# Patient Record
Sex: FEMALE | Race: BLACK OR AFRICAN AMERICAN | NOT HISPANIC OR LATINO
[De-identification: names, ages, dates, MRNs, and addresses within clinical notes are randomized per-mention and may not be internally consistent; named-entity substitution may affect disease eponyms.]

---

## 2020-02-26 ENCOUNTER — APPOINTMENT (OUTPATIENT)
Dept: HUMAN REPRODUCTION | Facility: CLINIC | Age: 35
End: 2020-02-26
Payer: COMMERCIAL

## 2020-02-26 PROBLEM — Z00.00 ENCOUNTER FOR PREVENTIVE HEALTH EXAMINATION: Status: ACTIVE | Noted: 2020-02-26

## 2020-02-26 PROCEDURE — 76817 TRANSVAGINAL US OBSTETRIC: CPT

## 2020-02-26 PROCEDURE — 99205 OFFICE O/P NEW HI 60 MIN: CPT | Mod: 25

## 2020-03-17 ENCOUNTER — TRANSCRIPTION ENCOUNTER (OUTPATIENT)
Age: 35
End: 2020-03-17

## 2020-03-25 ENCOUNTER — APPOINTMENT (OUTPATIENT)
Dept: HUMAN REPRODUCTION | Facility: CLINIC | Age: 35
End: 2020-03-25
Payer: COMMERCIAL

## 2020-03-25 PROCEDURE — 99423 OL DIG E/M SVC 21+ MIN: CPT

## 2020-04-30 ENCOUNTER — APPOINTMENT (OUTPATIENT)
Dept: HUMAN REPRODUCTION | Facility: CLINIC | Age: 35
End: 2020-04-30

## 2020-06-08 ENCOUNTER — APPOINTMENT (OUTPATIENT)
Dept: ENDOCRINOLOGY | Facility: CLINIC | Age: 35
End: 2020-06-08
Payer: COMMERCIAL

## 2020-06-08 VITALS
TEMPERATURE: 98.7 F | DIASTOLIC BLOOD PRESSURE: 76 MMHG | HEART RATE: 81 BPM | HEIGHT: 65 IN | WEIGHT: 255 LBS | BODY MASS INDEX: 42.49 KG/M2 | OXYGEN SATURATION: 98 % | SYSTOLIC BLOOD PRESSURE: 124 MMHG

## 2020-06-08 DIAGNOSIS — Z81.8 FAMILY HISTORY OF OTHER MENTAL AND BEHAVIORAL DISORDERS: ICD-10-CM

## 2020-06-08 DIAGNOSIS — Z82.69 FAMILY HISTORY OF OTHER DISEASES OF THE MUSCULOSKELETAL SYSTEM AND CONNECTIVE TISSUE: ICD-10-CM

## 2020-06-08 PROCEDURE — 99244 OFF/OP CNSLTJ NEW/EST MOD 40: CPT

## 2020-06-08 RX ORDER — GINSENG 100 MG
CAPSULE ORAL
Refills: 0 | Status: ACTIVE | COMMUNITY

## 2020-06-08 RX ORDER — ERGOCALCIFEROL (VITAMIN D2) 50 MCG
50 MCG CAPSULE ORAL
Refills: 0 | Status: ACTIVE | COMMUNITY

## 2020-06-08 RX ORDER — ELASTIC BANDAGE 2"X2.2YD
BANDAGE TOPICAL
Refills: 0 | Status: ACTIVE | COMMUNITY

## 2020-06-08 NOTE — HISTORY OF PRESENT ILLNESS
[FreeTextEntry1] : chief complaint: hypothyroidism\par \par Patient is a 34 year old woman with PMH hypothyroidism, here for evaluation of hypothyroidism. She has been referred to endocrine from reproductive endocrinology for management of hypothyroidism. She has been off birth control since her late 20s and has not become pregnant since then. She has been on Levothyroxine since around the age of 32 when she first was first evaluated by an infertility doctor at another institution. At one point, she self discontinued the LT4 for about 2 weeks but felt unwell, so has been back on LT4. Currently following with reproductive endocrinology with [facility]. She is currently on Levothyroxine 75 mcg daily, increased from 50 mcg daily sometime in March 2020 when she was found to have a TSH of 3.55 with free T4 of 1.2. Takes it daily in the AM, on an empty stomach, and waits several hours before eating. Reports her energy levels are low. She loses a lot of her hair when she washes her hair. Skin is dry. Has a constant rash on her arms. No neck pain, dysphagia, dysphonia. No chest pain, palpitations, SOB, cough, abdominal pain, nausea, vomiting, diarrhea, constipation. No heat or cold intolerance. She has lost weight through diet and exercise. She tries to walk every day. There is no family history of thyroid disease. No history of RT or surgery to the neck. \par \par She was also noted to have elevated prolactin of 26.2 in 3/2020. Does not have galactorrhea. Menses occur monthly but she does have spotting in between periods.

## 2020-06-08 NOTE — PHYSICAL EXAM
[Alert] : alert [No Acute Distress] : no acute distress [Well Nourished] : well nourished Airway patent, TM normal bilaterally, normal appearing mouth, nose, throat, neck supple with full range of motion, no cervical adenopathy. [Healthy Appearance] : healthy appearance [No Proptosis] : no proptosis [Normal Sclera/Conjunctiva] : normal sclera/conjunctiva [Normal Outer Ear/Nose] : the ears and nose were normal in appearance [Normal Hearing] : hearing was normal [No Neck Mass] : no neck mass was observed [No Thyroid Nodules] : no palpable thyroid nodules [Thyroid Not Enlarged] : the thyroid was not enlarged [No Accessory Muscle Use] : no accessory muscle use [No Respiratory Distress] : no respiratory distress [Normal to Percussion] : lungs were normal to percussion [Clear to Auscultation] : lungs were clear to auscultation bilaterally [Normal S1, S2] : normal S1 and S2 [Normal Rate] : heart rate was normal [Regular Rhythm] : with a regular rhythm [Pedal Pulses Normal] : the pedal pulses are present [No Edema] : no peripheral edema [Normal Bowel Sounds] : normal bowel sounds [Not Distended] : not distended [Not Tender] : non-tender [No Masses] : no abdominal mass palpated [Soft] : abdomen soft [Spine Straight] : spine straight [Normal Gait] : normal gait [No Involuntary Movements] : no involuntary movements were seen [No Clubbing, Cyanosis] : no clubbing  or cyanosis of the fingernails [No Tremors] : no tremors [No Motor Deficits] : the motor exam was normal [Oriented x3] : oriented to person, place, and time [Normal Affect] : the affect was normal [Normal Insight/Judgement] : insight and judgment were intact [Normal Mood] : the mood was normal [Kyphosis] : no kyphosis present

## 2020-06-08 NOTE — ASSESSMENT
[Levothyroxine] : The patient was instructed to take Levothyroxine on an empty stomach, separate from vitamins, and wait at least 30 minutes before eating [FreeTextEntry1] : 34 year old woman with hypothyroidism and hyperprolactinemia \par \par 1. Hypothyroidism, acquired:\par - she has been on LT4 for about two years, as part of management of infertility\par - she appears clinically euthyroid today\par - will check TSH and free T4 today, okay to aim for goal TSH < 2.5\par - based on results of TFTs, will adjust dose of LT4\par \par 2. Hyperprolactinemia:\par - noted to have mildly elevated prolactin of 26.2 in 3/2020\par - will repeat Prolactin level now with macroprolactin, LH, FSH, estradiol levels\par - if prolactin level normal, no further work up needed\par - if still noted to have true hyperprolactinemia, will need pituitary evaluation\par \par Return visit in 4 months

## 2020-06-08 NOTE — REASON FOR VISIT
[Initial Evaluation] : an initial evaluation [Hypothyroidism] : hypothyroidism [FreeTextEntry2] : Dr. Childress

## 2020-06-08 NOTE — REVIEW OF SYSTEMS
[Fatigue] : fatigue [Recent Weight Loss (___ Lbs)] : recent weight loss: [unfilled] lbs [As Noted in HPI] : as noted in HPI [Hair Loss] : hair loss [Dry Skin] : dry skin [All other systems negative] : All other systems negative [Fever] : no fever [Recent Weight Gain (___ Lbs)] : no recent weight gain [Chills] : no chills [Dysphagia] : no dysphagia [Neck Pain] : no neck pain [Dysphonia] : no dysphonia [Chest Pain] : no chest pain [Palpitations] : no palpitations [Lower Ext Edema] : no lower extremity edema [Shortness Of Breath] : no shortness of breath [Cough] : no cough [Nausea] : no nausea [Constipation] : no constipation [Abdominal Pain] : no abdominal pain [Vomiting] : no vomiting [Diarrhea] : no diarrhea [Irregular Menses] : regular menses [Headaches] : no headaches [Tremors] : no tremors [Cold Intolerance] : no cold intolerance [Heat Intolerance] : no heat intolerance

## 2020-06-10 LAB
ESTRADIOL SERPL-MCNC: 86 PG/ML
FSH SERPL-MCNC: 4.1 IU/L
LH SERPL-ACNC: 8.4 IU/L
PROLACTIN SERPL-MCNC: 23.8 NG/ML
T4 FREE SERPL-MCNC: 1.2 NG/DL
TSH SERPL-ACNC: 0.79 UIU/ML

## 2020-06-15 LAB
MONOMERIC PROLACTIN (ICMA)*: 17 NG/ML
PERCENT MACROPROLACTIN: 11 %
PROLACTIN, SERUM (ICMA)*: 19 NG/ML

## 2020-09-07 ENCOUNTER — RX RENEWAL (OUTPATIENT)
Age: 35
End: 2020-09-07

## 2020-10-13 ENCOUNTER — APPOINTMENT (OUTPATIENT)
Dept: ENDOCRINOLOGY | Facility: CLINIC | Age: 35
End: 2020-10-13

## 2021-04-26 ENCOUNTER — APPOINTMENT (OUTPATIENT)
Dept: ENDOCRINOLOGY | Facility: CLINIC | Age: 36
End: 2021-04-26
Payer: COMMERCIAL

## 2021-04-26 VITALS
SYSTOLIC BLOOD PRESSURE: 106 MMHG | WEIGHT: 283 LBS | OXYGEN SATURATION: 99 % | DIASTOLIC BLOOD PRESSURE: 70 MMHG | BODY MASS INDEX: 47.09 KG/M2 | TEMPERATURE: 98.2 F | HEART RATE: 82 BPM

## 2021-04-26 PROCEDURE — 99072 ADDL SUPL MATRL&STAF TM PHE: CPT

## 2021-04-26 PROCEDURE — 99214 OFFICE O/P EST MOD 30 MIN: CPT

## 2021-04-28 LAB
25(OH)D3 SERPL-MCNC: 28.6 NG/ML
PROLACTIN SERPL-MCNC: 12.4 NG/ML
T4 FREE SERPL-MCNC: 1.2 NG/DL
TSH SERPL-ACNC: 1.38 UIU/ML

## 2021-04-28 NOTE — PHYSICAL EXAM
[Alert] : alert [Well Nourished] : well nourished [Healthy Appearance] : healthy appearance [No Acute Distress] : no acute distress [Normal Sclera/Conjunctiva] : normal sclera/conjunctiva [No Proptosis] : no proptosis [Normal Outer Ear/Nose] : the ears and nose were normal in appearance [Normal Hearing] : hearing was normal [No Neck Mass] : no neck mass was observed [Supple] : the neck was supple [Thyroid Not Enlarged] : the thyroid was not enlarged [No Respiratory Distress] : no respiratory distress [No Accessory Muscle Use] : no accessory muscle use [Normal Rate and Effort] : normal respiratory rate and effort [Clear to Auscultation] : lungs were clear to auscultation bilaterally [Normal S1, S2] : normal S1 and S2 [Normal Rate] : heart rate was normal [Regular Rhythm] : with a regular rhythm [No Edema] : no peripheral edema [Normal Bowel Sounds] : normal bowel sounds [Not Tender] : non-tender [Not Distended] : not distended [Soft] : abdomen soft [Spine Straight] : spine straight [Normal Gait] : normal gait [No Clubbing, Cyanosis] : no clubbing  or cyanosis of the fingernails [No Involuntary Movements] : no involuntary movements were seen [No Motor Deficits] : the motor exam was normal [No Tremors] : no tremors [Oriented x3] : oriented to person, place, and time [Normal Affect] : the affect was normal [Normal Insight/Judgement] : insight and judgment were intact [Normal Mood] : the mood was normal [Kyphosis] : no kyphosis present

## 2021-04-28 NOTE — ASSESSMENT
[Levothyroxine] : The patient was instructed to take Levothyroxine on an empty stomach, separate from vitamins, and wait at least 30 minutes before eating [FreeTextEntry1] : 35 year old woman with hypothyroidism and ? hyperprolactinemia \par \par 1. Hypothyroidism, acquired:\par - she has been on LT4 for several years for TSH optimization in the setting of infertility\par - she appears clinically euthyroid today\par - will check TSH and free T4 today, okay to aim for goal TSH < 2.5\par - based on results of TFTs, will adjust dose of LT4\par \par 2. Hyperprolactinemia:\par - noted to have mildly elevated prolactin of 26.2 in 3/2020, repeat Prolactin was normal in 6/2020\par - will repeat Prolactin level now with macroprolactin\par - asymptomatic \par \par 3. Vitamin D insufficiency: check vitamin D 25 level\par \par Return visit in 6 months

## 2021-04-28 NOTE — HISTORY OF PRESENT ILLNESS
[FreeTextEntry1] : chief complaint: hypothyroidism\par \par Patient is a 35 year old woman with hypothyroidism, here for follow up of hypothyroidism. She was referred to endocrine from reproductive endocrinology for management of hypothyroidism. She has been off birth control since her late 20s and has not become pregnant since then. She has been on Levothyroxine since around the age of 32 when she first was evaluated by an infertility doctor at [facility]. At one point, she self discontinued the LT4 for about 2 weeks but felt unwell, so has been back on LT4. Currently following with reproductive endocrinology with [facility]. She is currently on Levothyroxine 75 mcg daily, increased from 50 mcg daily sometime in March 2020 when she was found to have a TSH of 3.55 with free T4 of 1.2. Repeat TFTs (TSH 0.79) normal in 6/2020. She takes LT4 daily in the AM, on an empty stomach, and waits 1 hour before eating. Energy levels are better now. No neck pain, dysphagia, dysphonia. No chest pain, palpitations, SOB, cough, abdominal pain, nausea, vomiting, diarrhea, constipation. No heat or cold intolerance. She states that she has lost weight. Has some hair loss, no skin changes. There is no family history of thyroid disease. No history of RT or surgery to the neck. 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 She failed IUI x 3 previously. \par \par She was also noted to have elevated prolactin of 26.2 in 3/2020, but repeat prolactin was normal in 6/2020. Does not have galactorrhea. Menses occur monthly every 28-30 days. \par \rolanda Takes vitamin D 2000 units daily for vitamin D insufficiency.

## 2021-04-28 NOTE — REVIEW OF SYSTEMS
[All other systems negative] : All other systems negative [Recent Weight Loss (___ Lbs)] : recent weight loss: [unfilled] lbs [Hair Loss] : hair loss [Recent Weight Gain (___ Lbs)] : no recent weight gain [Fever] : no fever [Chills] : no chills [Dysphagia] : no dysphagia [Neck Pain] : no neck pain [Dysphonia] : no dysphonia [Chest Pain] : no chest pain [Palpitations] : no palpitations [Lower Ext Edema] : no lower extremity edema [Shortness Of Breath] : no shortness of breath [Cough] : no cough [Nausea] : no nausea [Constipation] : no constipation [Abdominal Pain] : no abdominal pain [Vomiting] : no vomiting [Diarrhea] : no diarrhea [Irregular Menses] : regular menses [Dry Skin] : no dry skin [Tremors] : no tremors [Cold Intolerance] : no cold intolerance [Heat Intolerance] : no heat intolerance [FreeTextEntry8] : every 28-30 days

## 2021-05-03 LAB
MONOMERIC PROLACTIN (ICMA)*: 9.02 NG/ML
PERCENT MACROPROLACTIN: 28 %
PROLACTIN, SERUM (ICMA)*: 12.5 NG/ML

## 2021-10-19 ENCOUNTER — APPOINTMENT (OUTPATIENT)
Dept: ENDOCRINOLOGY | Facility: CLINIC | Age: 36
End: 2021-10-19

## 2021-10-25 ENCOUNTER — TRANSCRIPTION ENCOUNTER (OUTPATIENT)
Age: 36
End: 2021-10-25

## 2021-12-09 ENCOUNTER — APPOINTMENT (OUTPATIENT)
Dept: ENDOCRINOLOGY | Facility: CLINIC | Age: 36
End: 2021-12-09

## 2021-12-27 ENCOUNTER — APPOINTMENT (OUTPATIENT)
Dept: ENDOCRINOLOGY | Facility: CLINIC | Age: 36
End: 2021-12-27
Payer: COMMERCIAL

## 2021-12-27 VITALS
BODY MASS INDEX: 43.65 KG/M2 | HEIGHT: 65 IN | DIASTOLIC BLOOD PRESSURE: 74 MMHG | SYSTOLIC BLOOD PRESSURE: 120 MMHG | HEART RATE: 83 BPM | OXYGEN SATURATION: 99 % | WEIGHT: 262 LBS | RESPIRATION RATE: 16 BRPM | TEMPERATURE: 97.6 F

## 2021-12-27 PROCEDURE — 99214 OFFICE O/P EST MOD 30 MIN: CPT

## 2021-12-27 NOTE — ASSESSMENT
[FreeTextEntry1] : 35 year old woman with hypothyroidism and prior hyperPRL (now resolved). Here for follow up.\par \par 1. Hypothyroidism, acquired:\par - she has been on LT4 for several years for TSH optimization in the setting of infertility\par - she appears clinically euthyroid today\par - will check TSH and free T4 today, okay to aim for goal TSH < 2.5 while planning possible pregnancy\par - check TPOab\par - based on results of TFTs, will adjust dose of LT4\par \par 2. Hyperprolactinemia, resolved\par - noted to have mildly elevated prolactin of 26.2 in 3/2020, repeat Prolactin was normal in 6/2020 and 4/2021\par - asymptomatic and regular menses, defer checking again today \par \par 3. Vitamin D insufficiency:\par check vitamin D 25 level\par continue vit d 2000 iu daily\par \par 4. Emotional stress\par - pt overwhelmed after dissertation completed 7/2021, trouble focusing on tasks. No anxiety/depression\par - offered support, recommended seeing PCP and possibly therapist for further eval\par \par Return visit in 4-6 months  [Levothyroxine] : The patient was instructed to take Levothyroxine on an empty stomach, separate from vitamins, and wait at least 30 minutes before eating

## 2021-12-27 NOTE — REVIEW OF SYSTEMS
[Hair Loss] : hair loss [All other systems negative] : All other systems negative [Fatigue] : fatigue [Recent Weight Gain (___ Lbs)] : recent weight gain: [unfilled] lbs [Recent Weight Loss (___ Lbs)] : no recent weight loss [Fever] : no fever [Chills] : no chills [Dysphagia] : no dysphagia [Neck Pain] : no neck pain [Dysphonia] : no dysphonia [Chest Pain] : no chest pain [Palpitations] : no palpitations [Lower Ext Edema] : no lower extremity edema [Shortness Of Breath] : no shortness of breath [Cough] : no cough [Nausea] : no nausea [Constipation] : no constipation [Abdominal Pain] : no abdominal pain [Vomiting] : no vomiting [Diarrhea] : no diarrhea [Irregular Menses] : regular menses [Dry Skin] : no dry skin [Tremors] : no tremors [Cold Intolerance] : no cold intolerance [Heat Intolerance] : no heat intolerance [FreeTextEntry8] : every 28-30 days

## 2021-12-27 NOTE — HISTORY OF PRESENT ILLNESS
[FreeTextEntry1] : chief complaint: hypothyroidism\[place] [id number] with Dr. Garcia\par \par Patient is a 36 year old woman with hypothyroidism, here for follow up of hypothyroidism. She was referred to endocrine from reproductive endocrinology for management of hypothyroidism. She has been off birth control since her late 20s and has not become pregnant since then. She has been on Levothyroxine since around the age of 32 when she first was evaluated by an infertility doctor at [facility]. At one point, she self discontinued the LT4 for about 2 weeks but felt very unwell, so went back on LT4. Was following with reproductive endocrinology with [facility]. Has not been back due to covid/school.\par \par She is currently on Levothyroxine 75 mcg daily, increased from 50 mcg daily sometime in March 2020 when she was found to have a TSH of 3.55 with free T4 of 1.2. Repeat TFTs (TSH 0.79) normal in 6/2020. She takes LT4 daily in the AM, on an empty stomach, and waits 1 hour before eating. No neck pain, dysphagia, dysphonia. No chest pain, palpitations, SOB, cough, abdominal pain, nausea, vomiting, diarrhea, constipation. No heat or cold intolerance. Weight fluctuates, more recent dealing with weight gain. Has some hair loss, no skin changes. There is no family history of thyroid disease. No history of RT or surgery to the neck. \par \par Not actively trying to get pregnant now but not using protection. Has not been back to repro endo since before covid. She failed IUI x 3 previously. \par \par Notes that she is very stressed, tired, and feels like she cannot focus or complete tasks. Feels overwhelmed since 7/2021 when she completed dissertation. No anxiety or depression.\par \par She was also noted to have elevated prolactin of 26.2 in 3/2020, but repeat prolactin was normal in 6/2020 and 4/2021. Does not have galactorrhea or breast tenderness. Menses occur monthly every 28-30 days. LMP now.\par \par Takes vitamin D 2000 units daily for vitamin D insufficiency. \par \par SH: \par Defended dissertation in June 2021. Just got a new job at [employer]  working remotely now.

## 2021-12-28 ENCOUNTER — TRANSCRIPTION ENCOUNTER (OUTPATIENT)
Age: 36
End: 2021-12-28

## 2021-12-28 LAB
25(OH)D3 SERPL-MCNC: 34 NG/ML
T4 FREE SERPL-MCNC: 1.2 NG/DL
THYROGLOB AB SERPL-ACNC: <20 IU/ML
THYROPEROXIDASE AB SERPL IA-ACNC: <10 IU/ML
TSH SERPL-ACNC: 3.46 UIU/ML

## 2022-05-02 ENCOUNTER — APPOINTMENT (OUTPATIENT)
Dept: ENDOCRINOLOGY | Facility: CLINIC | Age: 37
End: 2022-05-02
Payer: COMMERCIAL

## 2022-05-02 VITALS
SYSTOLIC BLOOD PRESSURE: 112 MMHG | HEART RATE: 86 BPM | TEMPERATURE: 98 F | HEIGHT: 65 IN | BODY MASS INDEX: 45.48 KG/M2 | OXYGEN SATURATION: 98 % | WEIGHT: 273 LBS | DIASTOLIC BLOOD PRESSURE: 70 MMHG

## 2022-05-02 PROCEDURE — 99214 OFFICE O/P EST MOD 30 MIN: CPT

## 2022-05-03 NOTE — ASSESSMENT
[Levothyroxine] : The patient was instructed to take Levothyroxine on an empty stomach, separate from vitamins, and wait at least 30 minutes before eating [FreeTextEntry1] : 36 year old woman with hypothyroidism and prior hyperPRL (now resolved). Here for follow up.\par \par 1. Hypothyroidism, acquired:\par - she has been on LT4 for several years for TSH optimization in the setting of infertility. TPOab negative\par - she appears clinically euthyroid today\par - will check TSH and free T4 today, okay to aim for goal TSH < 2.5 while planning possible pregnancy\par - based on results of TFTs, will adjust dose of LT4\par - continue LT4 100 mcg daily\par \par 2. Hyperprolactinemia, resolved\par - noted to have mildly elevated prolactin of 26.2 in 3/2020, repeat Prolactin was normal in 6/2020 and 4/2021\par - asymptomatic and regular menses, defer checking again today \par \par 3. Vitamin D insufficiency:\par continue vit d 2000 iu daily\par \par Return visit in 6 months \par Preferred pharmacy is [email or web address] ([email or web address] however I am unable to locate this in EMR

## 2022-05-03 NOTE — HISTORY OF PRESENT ILLNESS
[FreeTextEntry1] : chief complaint: hypothyroidism\[place] [id number] with Dr. Garcia\par \par Patient is a 36 year old woman with hypothyroidism, here for follow up of hypothyroidism. She was referred to endocrine from reproductive endocrinology for management of hypothyroidism. She has been off birth control since her late 20s and has not become pregnant since then. She has been on Levothyroxine since around the age of 32 when she first was evaluated by an infertility doctor at [facility]. At one point, she self discontinued the LT4 for about 2 weeks but felt very unwell, so went back on LT4. Was following with reproductive endocrinology with [facility]. Has not been back due to covid/school. Still deciding future fertility plans, which are currently on hold.\par \par She takes LT4 daily in the AM, on an empty stomach, and waits 1 hour before eating. No neck pain, dysphagia, dysphonia. No chest pain, palpitations, SOB, cough, abdominal pain, nausea, vomiting, diarrhea, constipation. No heat or cold intolerance. Weight fluctuates, more recent dealing with weight gain. Has some hair loss, no skin changes. There is no family history of thyroid disease. No history of RT or surgery to the neck. \par \par Not actively trying to get pregnant now but not using protection. Has not been back to repro endo since before covid. She failed IUI x 3 previously. \par \par She was also noted to have elevated prolactin of 26.2 in 3/2020, but repeat prolactin was normal in 6/2020 and 4/2021. Does not have galactorrhea or breast tenderness. Menses occur monthly every 28-30 days. LMP recently 2 weeks ago, no missed periods.\par \par Takes vitamin D 2000 units daily for vitamin D insufficiency. \par \par SH: \par Defended dissertation in June 2021. Just got a new job at [employer]  working remotely now.  \par \par Interval hx:\par just started lexapro. Dealing with a lot of stress at work. Feeling a lot better but still not quite feeling like herself.\par Has gained weight since last visit with stress.\par Still thinking about fertility planning - needs to wait 6 months for benefits for new job and she is not sure yet what her plans will be.\par LMP 2 weeks ago, regular.\par No galactorrhea.

## 2022-05-03 NOTE — REVIEW OF SYSTEMS
[Fatigue] : fatigue [Recent Weight Gain (___ Lbs)] : recent weight gain: [unfilled] lbs [Hair Loss] : hair loss [All other systems negative] : All other systems negative [Recent Weight Loss (___ Lbs)] : no recent weight loss [Fever] : no fever [Chills] : no chills [Dysphagia] : no dysphagia [Neck Pain] : no neck pain [Dysphonia] : no dysphonia [Chest Pain] : no chest pain [Palpitations] : no palpitations [Lower Ext Edema] : no lower extremity edema [Shortness Of Breath] : no shortness of breath [Cough] : no cough [Nausea] : no nausea [Constipation] : no constipation [Abdominal Pain] : no abdominal pain [Vomiting] : no vomiting [Diarrhea] : no diarrhea [Irregular Menses] : regular menses [Dry Skin] : no dry skin [Tremors] : no tremors [Cold Intolerance] : no cold intolerance [Heat Intolerance] : no heat intolerance [FreeTextEntry8] : every 28-30 days

## 2022-05-06 LAB
T4 FREE SERPL-MCNC: 1.4 NG/DL
TSH SERPL-ACNC: 0.18 UIU/ML

## 2022-07-08 ENCOUNTER — APPOINTMENT (OUTPATIENT)
Dept: HUMAN REPRODUCTION | Facility: CLINIC | Age: 37
End: 2022-07-08

## 2022-07-08 PROCEDURE — 99215 OFFICE O/P EST HI 40 MIN: CPT | Mod: 95

## 2022-07-21 ENCOUNTER — TRANSCRIPTION ENCOUNTER (OUTPATIENT)
Age: 37
End: 2022-07-21

## 2022-07-29 ENCOUNTER — TRANSCRIPTION ENCOUNTER (OUTPATIENT)
Age: 37
End: 2022-07-29

## 2022-12-12 ENCOUNTER — APPOINTMENT (OUTPATIENT)
Dept: ENDOCRINOLOGY | Facility: CLINIC | Age: 37
End: 2022-12-12

## 2022-12-12 VITALS
DIASTOLIC BLOOD PRESSURE: 80 MMHG | HEIGHT: 66 IN | HEART RATE: 89 BPM | WEIGHT: 272 LBS | OXYGEN SATURATION: 98 % | BODY MASS INDEX: 43.71 KG/M2 | SYSTOLIC BLOOD PRESSURE: 114 MMHG

## 2022-12-12 DIAGNOSIS — Z86.39 PERSONAL HISTORY OF OTHER ENDOCRINE, NUTRITIONAL AND METABOLIC DISEASE: ICD-10-CM

## 2022-12-12 PROCEDURE — 99214 OFFICE O/P EST MOD 30 MIN: CPT

## 2022-12-13 PROBLEM — Z86.39 HISTORY OF DIABETES MELLITUS: Status: RESOLVED | Noted: 2022-12-12 | Resolved: 2022-12-13

## 2022-12-13 NOTE — ASSESSMENT
[Levothyroxine] : The patient was instructed to take Levothyroxine on an empty stomach, separate from vitamins, and wait at least 30 minutes before eating [FreeTextEntry1] : 36 year old woman with hypothyroidism and prior hyperPRL (now resolved). Here for follow up.\par \par 1. Hypothyroidism, acquired:\par - she has been on LT4 for several years for TSH optimization in the setting of infertility. TPOab negative\par - she appears clinically euthyroid today\par - will check TSH and free T4 today, okay to aim for goal TSH < 2.5 while planning possible pregnancy\par - based on results of TFTs, will adjust dose of LT4\par - continue LT4 75 mcg daily\par \par 2. Hyperprolactinemia, resolved\par - noted to have mildly elevated prolactin of 26.2 in 3/2020, repeat Prolactin was normal. Had elevated macroPRL on outside labs in 7/2022\par - asymptomatic and regular menses, defer checking again today \par \par 3. Vitamin D insufficiency:\par continue vit d 2000 iu daily\par \par Return visit in 6 months

## 2022-12-13 NOTE — DISCUSSION/SUMMARY
[FreeTextEntry1] : Pt starting fertility management with [facility], notified me TSH was low 0.22 by portal\par will obtain labs\par reduce lt4 75 mcg - noted started since tsh >2.5 previously while trying to get pregnant, have been downtitrating. If continuing to downtitrate may not even need LT4\par repeat labs in 4-6 weeks

## 2022-12-13 NOTE — REVIEW OF SYSTEMS
[Hair Loss] : hair loss [All other systems negative] : All other systems negative [Fatigue] : no fatigue [Recent Weight Gain (___ Lbs)] : no recent weight gain [Recent Weight Loss (___ Lbs)] : no recent weight loss [Fever] : no fever [Chills] : no chills [Dysphagia] : no dysphagia [Neck Pain] : no neck pain [Dysphonia] : no dysphonia [Chest Pain] : no chest pain [Palpitations] : no palpitations [Lower Ext Edema] : no lower extremity edema [Shortness Of Breath] : no shortness of breath [Cough] : no cough [Nausea] : no nausea [Constipation] : no constipation [Abdominal Pain] : no abdominal pain [Vomiting] : no vomiting [Diarrhea] : no diarrhea [Irregular Menses] : regular menses [Dry Skin] : no dry skin [Tremors] : no tremors [Cold Intolerance] : no cold intolerance [Heat Intolerance] : no heat intolerance [FreeTextEntry8] : every 28-30 days

## 2022-12-13 NOTE — HISTORY OF PRESENT ILLNESS
[FreeTextEntry1] : chief complaint: hypothyroidism\par \par Patient is a 37 year old woman with hypothyroidism, here for follow up of hypothyroidism. She was referred to endocrine from reproductive endocrinology for management of hypothyroidism. She has been off birth control since her late 20s and has not become pregnant since then. She has been on Levothyroxine since around the age of 32 when she first was evaluated by an infertility doctor at [facility]. At one point, she self discontinued the LT4 for about 2 weeks but felt very unwell, so went back on LT4. Was following with reproductive endocrinology with [facility]. Has not been back due to covid/school. Still deciding future fertility plans, which are currently on hold.\par \par She takes LT4 daily in the AM, on an empty stomach, and waits 1 hour before eating. No neck pain, dysphagia, dysphonia. No chest pain, palpitations, SOB, cough, abdominal pain, nausea, vomiting, diarrhea, constipation. No heat or cold intolerance. Weight fluctuates, more recent dealing with weight gain. Has some hair loss, no skin changes. There is no family history of thyroid disease. No history of RT or surgery to the neck. \par \par Not actively trying to get pregnant now but not using protection. Has not been back to repro endo since before covid. She failed IUI x 3 previously. \par \par She was also noted to have elevated prolactin of 26.2 in 3/2020, but repeat prolactin was normal in 6/2020 and 4/2021. Does not have galactorrhea or breast tenderness. Menses occur monthly every 28-30 days. LMP recently 2 weeks ago, no missed periods.\par \par Takes vitamin D 2000 units daily for vitamin D insufficiency. \par \par SH: \par Defended dissertation in June 2021. Just got a new job at [employer]  \par \par Interval hx:\par Still thinking about fertility planning.\par LT4 75 mcg daily - takes empty stomach then other meds at night \par feels well\par on lexapro 10 mg once daily + vyvanse M-F 20mg prn + coq 10, vit E and vit D  \par LMP now\par no galactorrhea \par FH with DM - dad with Type 2 DM in 6/2022

## 2022-12-15 ENCOUNTER — TRANSCRIPTION ENCOUNTER (OUTPATIENT)
Age: 37
End: 2022-12-15

## 2022-12-15 LAB
25(OH)D3 SERPL-MCNC: 33.1 NG/ML
ALBUMIN SERPL ELPH-MCNC: 4.5 G/DL
ALP BLD-CCNC: 75 U/L
ALT SERPL-CCNC: 25 U/L
ANION GAP SERPL CALC-SCNC: 14 MMOL/L
AST SERPL-CCNC: 20 U/L
BILIRUB SERPL-MCNC: 0.2 MG/DL
BUN SERPL-MCNC: 9 MG/DL
CALCIUM SERPL-MCNC: 10 MG/DL
CHLORIDE SERPL-SCNC: 103 MMOL/L
CHOLEST SERPL-MCNC: 168 MG/DL
CO2 SERPL-SCNC: 23 MMOL/L
CREAT SERPL-MCNC: 0.71 MG/DL
EGFR: 112 ML/MIN/1.73M2
ESTIMATED AVERAGE GLUCOSE: 111 MG/DL
GLUCOSE SERPL-MCNC: 85 MG/DL
HBA1C MFR BLD HPLC: 5.5 %
HDLC SERPL-MCNC: 61 MG/DL
LDLC SERPL CALC-MCNC: 87 MG/DL
NONHDLC SERPL-MCNC: 107 MG/DL
POTASSIUM SERPL-SCNC: 4.1 MMOL/L
PROT SERPL-MCNC: 7 G/DL
SODIUM SERPL-SCNC: 140 MMOL/L
T4 FREE SERPL-MCNC: 1.1 NG/DL
THYROGLOB AB SERPL-ACNC: <20 IU/ML
THYROPEROXIDASE AB SERPL IA-ACNC: <10 IU/ML
TRIGL SERPL-MCNC: 98 MG/DL
TSH SERPL-ACNC: 3.62 UIU/ML

## 2022-12-16 ENCOUNTER — TRANSCRIPTION ENCOUNTER (OUTPATIENT)
Age: 37
End: 2022-12-16

## 2023-05-19 ENCOUNTER — RX RENEWAL (OUTPATIENT)
Age: 38
End: 2023-05-19

## 2023-07-07 ENCOUNTER — TRANSCRIPTION ENCOUNTER (OUTPATIENT)
Age: 38
End: 2023-07-07

## 2023-08-18 ENCOUNTER — APPOINTMENT (OUTPATIENT)
Dept: ENDOCRINOLOGY | Facility: CLINIC | Age: 38
End: 2023-08-18
Payer: COMMERCIAL

## 2023-08-18 VITALS
WEIGHT: 274 LBS | HEART RATE: 74 BPM | SYSTOLIC BLOOD PRESSURE: 118 MMHG | DIASTOLIC BLOOD PRESSURE: 80 MMHG | OXYGEN SATURATION: 99 % | HEIGHT: 66 IN | BODY MASS INDEX: 44.03 KG/M2

## 2023-08-18 PROCEDURE — 99214 OFFICE O/P EST MOD 30 MIN: CPT

## 2023-08-23 LAB
25(OH)D3 SERPL-MCNC: 30.3 NG/ML
ALBUMIN SERPL ELPH-MCNC: 4.4 G/DL
ALP BLD-CCNC: 71 U/L
ALT SERPL-CCNC: 19 U/L
ANION GAP SERPL CALC-SCNC: 11 MMOL/L
AST SERPL-CCNC: 19 U/L
BILIRUB SERPL-MCNC: 0.4 MG/DL
BUN SERPL-MCNC: 10 MG/DL
CALCIUM SERPL-MCNC: 9.3 MG/DL
CHLORIDE SERPL-SCNC: 105 MMOL/L
CHOLEST SERPL-MCNC: 168 MG/DL
CO2 SERPL-SCNC: 23 MMOL/L
CREAT SERPL-MCNC: 0.68 MG/DL
EGFR: 114 ML/MIN/1.73M2
ESTIMATED AVERAGE GLUCOSE: 108 MG/DL
GLUCOSE SERPL-MCNC: 91 MG/DL
HBA1C MFR BLD HPLC: 5.4 %
HDLC SERPL-MCNC: 56 MG/DL
LDLC SERPL CALC-MCNC: 98 MG/DL
NONHDLC SERPL-MCNC: 112 MG/DL
POTASSIUM SERPL-SCNC: 4.3 MMOL/L
PROT SERPL-MCNC: 6.8 G/DL
SODIUM SERPL-SCNC: 139 MMOL/L
T4 FREE SERPL-MCNC: 1.1 NG/DL
TRIGL SERPL-MCNC: 75 MG/DL
TSH SERPL-ACNC: 2.18 UIU/ML

## 2023-08-23 NOTE — HISTORY OF PRESENT ILLNESS
[FreeTextEntry1] : chief complaint: hypothyroidism  Patient is a 37 year old woman with hypothyroidism, here for follow up of hypothyroidism. She was referred to endocrine from reproductive endocrinology for management of hypothyroidism. She has been off birth control since her late 20s and has not become pregnant since then. She has been on Levothyroxine since around the age of 32 when she first was evaluated by an infertility doctor at [facility]. At one point, she self discontinued the LT4 for about 2 weeks but felt very unwell, so went back on LT4. Was following with reproductive endocrinology with [facility]. Has not been back due to covid/school. Still deciding future fertility plans, which are currently on hold.  She takes LT4 daily in the AM, on an empty stomach, and waits 1 hour before eating. No neck pain, dysphagia, dysphonia. No chest pain, palpitations, SOB, cough, abdominal pain, nausea, vomiting, diarrhea, constipation. No heat or cold intolerance. Weight fluctuates, more recent dealing with weight gain. Has some hair loss, no skin changes. There is no family history of thyroid disease. No history of RT or surgery to the neck.   Not actively trying to get pregnant now but not using protection. Has not been back to repro endo since before covid. She failed IUI x 3 previously.   She was also noted to have elevated prolactin of 26.2 in 3/2020, but repeat prolactin was normal in 6/2020 and 4/2021. Does not have galactorrhea or breast tenderness. Menses occur monthly every 28-30 days. LMP recently 2 weeks ago, no missed periods.   Takes vitamin D 2000 units daily for vitamin D insufficiency.   SH:  Defended dissertation in June 2021. Just got a new job at [employer]    Interval hx: Still thinking about fertility planning, but in the far future as she would like to get weight lower prior to trying again, so plans to conceive are currently on hold. Current Regimen: Levothyroxine was increased from 75 mcg daily to 75 mcg x 8 tabs per week since last visit 12/2022- adherent and takes on empty stomach then other meds at night. Symptoms: Chronic fatigue, weight stable but feels she's gaining, often runs hot, mild hair loss but stable. BMs normal, mood normal. On lexapro 10 mg once daily. Reports stopped Vyvanse M-F 20mg prn recently. Stopped coq 10 (weird dreams). On vit E and vit D 1000 IU daily Periods are regular, though has "large clots". She is s/p oophorectomy for large right ovarian cyst. Used to be on NuvaRing, has been off all contraceptives since 10 years ago. Possible PCOS?  Reports a previous doctor in [place] believed she had it, but her current OBGYN does not think so.  At age 17 she tried metformin for PCOS. Reports dark hair on her chin (does not remove) and nipples. No galactorrhea. Obesity: She struggles with losing weight (BMI 44), feels she is emotionally connected to food but has not been eating any differently and has been watching her diet.  When she tries to exercise, she feels she just gets "jiggly fat", occasionally tries strength training.  She is interested in GLP-1 for weight loss benefit. Recent A1c 5.4%, LDL 98, HDL 56, TG 75, eGFR 114, LFTs wnl (8/4/2023). No personal h/o DM. FH with DM - dad with Type 2 DM in 6/2022 Denies personal or FHx of pancreatitis or thyroid cancer.

## 2023-08-23 NOTE — REVIEW OF SYSTEMS
[Hair Loss] : hair loss [All other systems negative] : All other systems negative [Fever] : no fever [Chills] : no chills [Cough] : no cough [Fatigue] : fatigue [Recent Weight Gain (___ Lbs)] : no recent weight gain [Recent Weight Loss (___ Lbs)] : no recent weight loss [Dysphagia] : no dysphagia [Neck Pain] : no neck pain [Dysphonia] : no dysphonia [Chest Pain] : no chest pain [Palpitations] : no palpitations [Lower Ext Edema] : no lower extremity edema [Shortness Of Breath] : no shortness of breath [Nausea] : no nausea [Constipation] : no constipation [Abdominal Pain] : no abdominal pain [Vomiting] : no vomiting [Diarrhea] : no diarrhea [Irregular Menses] : regular menses [Dry Skin] : no dry skin [Tremors] : no tremors [Cold Intolerance] : no cold intolerance [Heat Intolerance] : no heat intolerance [FreeTextEntry8] : every 28-30 days

## 2023-08-23 NOTE — PHYSICAL EXAM
[Alert] : alert [Healthy Appearance] : healthy appearance [Well Nourished] : well nourished [No Acute Distress] : no acute distress [No Proptosis] : no proptosis [Normal Sclera/Conjunctiva] : normal sclera/conjunctiva [Normal Outer Ear/Nose] : the ears and nose were normal in appearance [Normal Hearing] : hearing was normal [No Neck Mass] : no neck mass was observed [Supple] : the neck was supple [Thyroid Not Enlarged] : the thyroid was not enlarged [No Respiratory Distress] : no respiratory distress [No Accessory Muscle Use] : no accessory muscle use [Normal Rate and Effort] : normal respiratory rate and effort [Clear to Auscultation] : lungs were clear to auscultation bilaterally [Normal S1, S2] : normal S1 and S2 [Normal Rate] : heart rate was normal [Regular Rhythm] : with a regular rhythm [No Edema] : no peripheral edema [Normal Bowel Sounds] : normal bowel sounds [Not Tender] : non-tender [Not Distended] : not distended [Soft] : abdomen soft [Spine Straight] : spine straight [Normal Gait] : normal gait [No Clubbing, Cyanosis] : no clubbing  or cyanosis of the fingernails [No Involuntary Movements] : no involuntary movements were seen [No Motor Deficits] : the motor exam was normal [No Tremors] : no tremors [Oriented x3] : oriented to person, place, and time [Normal Affect] : the affect was normal [Normal Insight/Judgement] : insight and judgment were intact [Normal Mood] : the mood was normal [Kyphosis] : no kyphosis present

## 2023-08-23 NOTE — ASSESSMENT
[Levothyroxine] : The patient was instructed to take Levothyroxine on an empty stomach, separate from vitamins, and wait at least 30 minutes before eating [FreeTextEntry1] : 37 year old woman with hypothyroidism and prior hyperPRL (now resolved), also with obesity. Here for follow up.  1. Hypothyroidism, acquired: - she has been on LT4 for several years for TSH optimization in the setting of infertility. TPOab negative - she appears clinically euthyroid today - recent TSH 2.18, FT4 1.1 (8/4/2023), okay to aim for goal TSH < 2.5 while planning possible pregnancy - based on results of TFTs, can continue current dose of LT4 75 mcg x 8 tabs per week  2. Hyperprolactinemia, resolved - noted to have mildly elevated prolactin of 26.2 in 3/2020, repeat Prolactin was normal. Had elevated macroPRL on outside labs in 7/2022 - asymptomatic and regular menses, defer checking again today   3. Vitamin D insufficiency: - recent vitamin D level 30.3 wnl (8/4/2023) - continue vit d 1000 iu daily  4. Obesity - Patient is a good candidate for GLP-1 for weight loss benefit (BMI 44).  Has tried some lifestyle modifications with none to minimal success for weight loss. R+B of GLP-1 discussed with the patient.  Denies personal or FHx of pancreatitis or thyroid cancer. - Will trial Wegovy 0.25 mg weekly, can plan to uptitrate in 4-6 weeks if tolerating. Discussed that GLP-1 should be used in adjunct to diet and exercise modifications. Advised would have to stop GLP-1 few months prior to actively trying to conceive or if pregnant.  Recent lab results (8/2023) discussed with the patient. Return visit in 3 months with Dr. Wong.  Adelaide Magdaleno NP (Brenda)

## 2023-09-08 ENCOUNTER — TRANSCRIPTION ENCOUNTER (OUTPATIENT)
Age: 38
End: 2023-09-08

## 2023-10-18 ENCOUNTER — RX RENEWAL (OUTPATIENT)
Age: 38
End: 2023-10-18

## 2024-01-17 ENCOUNTER — APPOINTMENT (OUTPATIENT)
Dept: ENDOCRINOLOGY | Facility: CLINIC | Age: 39
End: 2024-01-17
Payer: COMMERCIAL

## 2024-01-17 VITALS
OXYGEN SATURATION: 98 % | DIASTOLIC BLOOD PRESSURE: 72 MMHG | HEART RATE: 79 BPM | SYSTOLIC BLOOD PRESSURE: 106 MMHG | HEIGHT: 66 IN | WEIGHT: 277 LBS | BODY MASS INDEX: 44.52 KG/M2

## 2024-01-17 DIAGNOSIS — E22.1 HYPERPROLACTINEMIA: ICD-10-CM

## 2024-01-17 PROCEDURE — 99214 OFFICE O/P EST MOD 30 MIN: CPT

## 2024-01-17 NOTE — REASON FOR VISIT
general questions without assessment/multiparous mom [Follow - Up] : a follow-up visit [Hypothyroidism] : hypothyroidism

## 2024-01-17 NOTE — PHYSICAL EXAM
[Alert] : alert [Well Nourished] : well nourished [Healthy Appearance] : healthy appearance [No Acute Distress] : no acute distress [Normal Sclera/Conjunctiva] : normal sclera/conjunctiva [No Proptosis] : no proptosis [Supple] : the neck was supple [Thyroid Not Enlarged] : the thyroid was not enlarged [No Respiratory Distress] : no respiratory distress [No Accessory Muscle Use] : no accessory muscle use [Clear to Auscultation] : lungs were clear to auscultation bilaterally [Normal S1, S2] : normal S1 and S2 [Normal Rate] : heart rate was normal [Regular Rhythm] : with a regular rhythm [No Edema] : no peripheral edema [Normal Bowel Sounds] : normal bowel sounds [Not Tender] : non-tender [Not Distended] : not distended [Soft] : abdomen soft [Spine Straight] : spine straight [Normal Gait] : normal gait [No Clubbing, Cyanosis] : no clubbing  or cyanosis of the fingernails [No Involuntary Movements] : no involuntary movements were seen [No Motor Deficits] : the motor exam was normal [No Tremors] : no tremors [Oriented x3] : oriented to person, place, and time [Normal Affect] : the affect was normal [Normal Insight/Judgement] : insight and judgment were intact [Normal Mood] : the mood was normal [Kyphosis] : no kyphosis present

## 2024-01-17 NOTE — HISTORY OF PRESENT ILLNESS
[FreeTextEntry1] : chief complaint: hypothyroidism  Patient is a 37 year old woman with hypothyroidism, here for follow up of hypothyroidism. She was referred to endocrine from reproductive endocrinology for management of hypothyroidism. She has been off birth control since her late 20s and has not become pregnant since then. She has been on Levothyroxine since around the age of 32 when she first was evaluated by an infertility doctor at [facility]. At one point, she self discontinued the LT4 for about 2 weeks but felt very unwell, so went back on LT4. Was following with reproductive endocrinology with Mary Ann. Has not been back due to covid/school. Still deciding future fertility plans, which are currently on hold.  She takes LT4 daily in the AM, on an empty stomach, and waits 1 hour before eating. No neck pain, dysphagia, dysphonia. No chest pain, palpitations, SOB, cough, abdominal pain, nausea, vomiting, diarrhea, constipation. No heat or cold intolerance. Weight fluctuates, more recent dealing with weight gain. Has some hair loss, no skin changes. There is no family history of thyroid disease. No history of RT or surgery to the neck.   Not actively trying to get pregnant now but not using protection. Has not been back to repro endo since before covid. She failed IUI x 3 previously.   She was also noted to have elevated prolactin of 26.2 in 3/2020, but repeat prolactin was normal in 6/2020 and 4/2021. Does not have galactorrhea or breast tenderness. Menses occur monthly every 28-30 days, has some mid-cycle spotting. LMP 1/15/2024, no missed periods.   Takes vitamin D 2000 units daily for vitamin D insufficiency.   SH:  Defended dissertation in June 2021. Just got a new job at [employer]    Interval hx: Still thinking about fertility planning, but in the far future as she would like to get weight lower prior to trying again, so plans to conceive are currently on hold. Current Regimen: Levothyroxine was increased from 75 mcg daily to 75 mcg x 8 tabs per week since last visit 12/2022- adherent and takes on empty stomach then other meds at night. Symptoms: Chronic fatigue, weight gain 20 lbs in 4 months (reports she stopped strict diet and just ate in moderation), often runs hot, mild hair loss but stable. BMs normal, mood normal. On lexapro 10 mg once daily. Reports stopped Vyvanse M-F 20mg prn recently. Stopped coq 10 (weird dreams). On vit E and vit D 1000 IU daily Periods are regular, though has "large clots". She is s/p oophorectomy for large right ovarian cyst. Used to be on NuvaRing, has been off all contraceptives since 10 years ago. Possible PCOS?  Reports a previous doctor in [place] believed she had it, but her current OBGYN does not think so.  At age 17 she tried metformin for PCOS. Reports dark hair on her chin (does not remove) and nipples. No galactorrhea. Obesity: She struggles with losing weight (BMI 44.71), feels she is emotionally connected to food but has not been eating any differently and has been watching her diet.  When she tries to exercise, she feels she just gets "jiggly fat", occasionally tries strength training.  She is interested in GLP-1 for weight loss benefit. Reports Mitesh was authorized but has not received it yet due to supply issues. She is willing to pay OOP for GLP-1. Recent A1c 5.4%, LDL 98, HDL 56, TG 75, eGFR 114, LFTs wnl (8/4/2023). No personal h/o DM. FH with DM - dad with Type 2 DM in 6/2022 Denies personal or FHx of pancreatitis or thyroid cancer. She is s/p cholecystectomy for gallstones (22 years ago). Also has abdominal hernia she would like to have resolved but states was told she needs to lose weight before that.

## 2024-01-17 NOTE — ASSESSMENT
[Levothyroxine] : The patient was instructed to take Levothyroxine on an empty stomach, separate from vitamins, and wait at least 30 minutes before eating [FreeTextEntry1] : 38 year old woman with hypothyroidism and prior hyperPRL (now resolved), also with obesity. Here for follow up.  1. Hypothyroidism, acquired: - she has been on LT4 for several years for TSH optimization in the setting of infertility. TPOab negative - she appears clinically euthyroid today - recent TSH 2.18, FT4 1.1 (8/4/2023), okay to aim for goal TSH < 2.5 while planning possible pregnancy - continue current dose of LT4 75 mcg x 8 tabs per week, can repeat TFTs now  2. Hyperprolactinemia, resolved - noted to have mildly elevated prolactin of 26.2 in 3/2020, repeat Prolactin was normal. Had elevated macroPRL on outside labs in 7/2022 - asymptomatic and regular menses, defer checking again today  3. Vitamin D insufficiency: - recent vitamin D level 30.3 wnl (8/4/2023) - continue vit d 1000 iu daily  4. Obesity - Patient is a good candidate for GLP-1 for weight loss benefit (BMI 44.7). Has tried some lifestyle modifications with none to minimal success for weight loss. R+B of GLP-1 discussed with the patient. Denies personal or FHx of pancreatitis or thyroid cancer. - Has not received Wegovy 0.25 mg weekly yet due to supply issues. Patient is willing to pay OOP for GLP-1 as she believes weight loss will benefit her health in multiple ways. Will try to send Zepbound 2.5 mg weekly, can plan to uptitrate in 4-6 weeks if tolerating. Discussed that GLP-1 should be used in adjunct to diet and exercise modifications. Advised would have to stop GLP-1 few months prior to actively trying to conceive or if pregnant, and best to use protection while on medication.  Return visit in 3 months with Dr. Wong.  Adelaide Magdaleno NP (Brenda).

## 2024-01-17 NOTE — REVIEW OF SYSTEMS
[Fatigue] : fatigue [Hair Loss] : hair loss [All other systems negative] : All other systems negative [Recent Weight Gain (___ Lbs)] : no recent weight gain [Recent Weight Loss (___ Lbs)] : no recent weight loss [Dysphagia] : no dysphagia [Neck Pain] : no neck pain [Dysphonia] : no dysphonia [Chest Pain] : no chest pain [Palpitations] : no palpitations [Lower Ext Edema] : no lower extremity edema [Shortness Of Breath] : no shortness of breath [Nausea] : no nausea [Constipation] : no constipation [Abdominal Pain] : no abdominal pain [Vomiting] : no vomiting [Diarrhea] : no diarrhea [Irregular Menses] : regular menses [Dry Skin] : no dry skin [Tremors] : no tremors [Cold Intolerance] : no cold intolerance [Heat Intolerance] : no heat intolerance [FreeTextEntry8] : every 28-30 days

## 2024-01-18 LAB
ALBUMIN SERPL ELPH-MCNC: 4.5 G/DL
ALP BLD-CCNC: 75 U/L
ALT SERPL-CCNC: 54 U/L
ANION GAP SERPL CALC-SCNC: 11 MMOL/L
AST SERPL-CCNC: 34 U/L
BILIRUB SERPL-MCNC: 0.3 MG/DL
BUN SERPL-MCNC: 10 MG/DL
CALCIUM SERPL-MCNC: 9.5 MG/DL
CHLORIDE SERPL-SCNC: 103 MMOL/L
CHOLEST SERPL-MCNC: 178 MG/DL
CO2 SERPL-SCNC: 24 MMOL/L
CREAT SERPL-MCNC: 0.67 MG/DL
EGFR: 114 ML/MIN/1.73M2
ESTIMATED AVERAGE GLUCOSE: 114 MG/DL
GLUCOSE SERPL-MCNC: 89 MG/DL
HBA1C MFR BLD HPLC: 5.6 %
HDLC SERPL-MCNC: 60 MG/DL
LDLC SERPL CALC-MCNC: 98 MG/DL
NONHDLC SERPL-MCNC: 118 MG/DL
POTASSIUM SERPL-SCNC: 4.1 MMOL/L
PROT SERPL-MCNC: 6.9 G/DL
SODIUM SERPL-SCNC: 139 MMOL/L
T4 FREE SERPL-MCNC: 1 NG/DL
TRIGL SERPL-MCNC: 111 MG/DL
TSH SERPL-ACNC: 2.51 UIU/ML

## 2024-01-19 ENCOUNTER — TRANSCRIPTION ENCOUNTER (OUTPATIENT)
Age: 39
End: 2024-01-19

## 2024-02-14 ENCOUNTER — TRANSCRIPTION ENCOUNTER (OUTPATIENT)
Age: 39
End: 2024-02-14

## 2024-02-14 RX ORDER — SEMAGLUTIDE 0.25 MG/.5ML
0.25 INJECTION, SOLUTION SUBCUTANEOUS
Qty: 1 | Refills: 2 | Status: DISCONTINUED | COMMUNITY
Start: 2023-08-23 | End: 2024-02-14

## 2024-03-13 ENCOUNTER — TRANSCRIPTION ENCOUNTER (OUTPATIENT)
Age: 39
End: 2024-03-13

## 2024-03-13 ENCOUNTER — RX RENEWAL (OUTPATIENT)
Age: 39
End: 2024-03-13

## 2024-03-24 ENCOUNTER — RX RENEWAL (OUTPATIENT)
Age: 39
End: 2024-03-24

## 2024-03-24 RX ORDER — LEVOTHYROXINE SODIUM 0.07 MG/1
75 TABLET ORAL
Qty: 96 | Refills: 1 | Status: ACTIVE | COMMUNITY
Start: 2020-05-17 | End: 1900-01-01

## 2024-05-13 ENCOUNTER — APPOINTMENT (OUTPATIENT)
Dept: ENDOCRINOLOGY | Facility: CLINIC | Age: 39
End: 2024-05-13
Payer: COMMERCIAL

## 2024-05-13 VITALS
SYSTOLIC BLOOD PRESSURE: 110 MMHG | HEIGHT: 66 IN | HEART RATE: 82 BPM | DIASTOLIC BLOOD PRESSURE: 82 MMHG | WEIGHT: 253 LBS | OXYGEN SATURATION: 98 % | BODY MASS INDEX: 40.66 KG/M2

## 2024-05-13 DIAGNOSIS — E66.01 MORBID (SEVERE) OBESITY DUE TO EXCESS CALORIES: ICD-10-CM

## 2024-05-13 DIAGNOSIS — E55.9 VITAMIN D DEFICIENCY, UNSPECIFIED: ICD-10-CM

## 2024-05-13 DIAGNOSIS — E03.9 HYPOTHYROIDISM, UNSPECIFIED: ICD-10-CM

## 2024-05-13 PROCEDURE — G2211 COMPLEX E/M VISIT ADD ON: CPT | Mod: NC,1L

## 2024-05-13 PROCEDURE — 99214 OFFICE O/P EST MOD 30 MIN: CPT

## 2024-05-13 RX ORDER — TIRZEPATIDE 7.5 MG/.5ML
7.5 INJECTION, SOLUTION SUBCUTANEOUS WEEKLY
Qty: 1 | Refills: 3 | Status: ACTIVE | COMMUNITY
Start: 2024-01-17 | End: 1900-01-01

## 2024-05-16 PROBLEM — E66.01 OBESITY, CLASS III, BMI 40-49.9 (MORBID OBESITY): Status: ACTIVE | Noted: 2023-08-23

## 2024-05-16 PROBLEM — E55.9 VITAMIN D INSUFFICIENCY: Status: ACTIVE | Noted: 2021-04-26

## 2024-05-16 PROBLEM — E03.9 HYPOTHYROIDISM (ACQUIRED): Status: ACTIVE | Noted: 2020-06-08

## 2024-05-17 ENCOUNTER — TRANSCRIPTION ENCOUNTER (OUTPATIENT)
Age: 39
End: 2024-05-17

## 2024-05-17 LAB
25(OH)D3 SERPL-MCNC: 41.8 NG/ML
ALBUMIN SERPL ELPH-MCNC: 4.7 G/DL
ALP BLD-CCNC: 79 U/L
ALT SERPL-CCNC: 29 U/L
ANION GAP SERPL CALC-SCNC: 14 MMOL/L
AST SERPL-CCNC: 24 U/L
BILIRUB SERPL-MCNC: 0.2 MG/DL
BUN SERPL-MCNC: 12 MG/DL
CALCIUM SERPL-MCNC: 9.9 MG/DL
CHLORIDE SERPL-SCNC: 102 MMOL/L
CHOLEST SERPL-MCNC: 185 MG/DL
CO2 SERPL-SCNC: 23 MMOL/L
CREAT SERPL-MCNC: 0.84 MG/DL
EGFR: 91 ML/MIN/1.73M2
ESTIMATED AVERAGE GLUCOSE: 103 MG/DL
GLUCOSE SERPL-MCNC: 90 MG/DL
HBA1C MFR BLD HPLC: 5.2 %
HDLC SERPL-MCNC: 61 MG/DL
LDLC SERPL CALC-MCNC: 106 MG/DL
NONHDLC SERPL-MCNC: 124 MG/DL
POTASSIUM SERPL-SCNC: 4.4 MMOL/L
PROT SERPL-MCNC: 7 G/DL
SODIUM SERPL-SCNC: 138 MMOL/L
TRIGL SERPL-MCNC: 100 MG/DL
TSH SERPL-ACNC: 2.91 UIU/ML

## 2024-05-17 RX ORDER — TIRZEPATIDE 7.5 MG/.5ML
7.5 INJECTION, SOLUTION SUBCUTANEOUS
Qty: 1 | Refills: 3 | Status: ACTIVE | COMMUNITY
Start: 2024-03-13 | End: 1900-01-01

## 2024-05-17 NOTE — HISTORY OF PRESENT ILLNESS
[FreeTextEntry1] : chief complaint: hypothyroidism, weight management   Patient is a 38 year old woman with hypothyroidism, here for follow up of hypothyroidism. She was referred to endocrine from reproductive endocrinology for management of hypothyroidism. She has been off birth control since her late 20s and has not become pregnant since then. She has been on Levothyroxine since around the age of 32 when she first was evaluated by an infertility doctor at [facility]. At one point, she self discontinued the LT4 for about 2 weeks but felt very unwell, so went back on LT4. Was following with reproductive endocrinology with Mary [facility]. Has not been back due to covid/school. Still deciding future fertility plans, which are currently on hold.  She takes LT4 daily in the AM, on an empty stomach, and waits 1 hour before eating. No neck pain, dysphagia, dysphonia. No chest pain, palpitations, SOB, cough, abdominal pain, nausea, vomiting, diarrhea, constipation. No heat or cold intolerance. Weight fluctuates, more recent dealing with weight gain. Has some hair loss, no skin changes. There is no family history of thyroid disease. No history of RT or surgery to the neck.   Not actively trying to get pregnant now but not using protection. Has not been back to repro endo since before covid. She failed IUI x 3 previously.   She was also noted to have elevated prolactin of 26.2 in 3/2020, but repeat prolactin was normal in 6/2020 and 4/2021. Does not have galactorrhea or breast tenderness. Menses occur monthly every 28-30 days, has some mid-cycle spotting. LMP 1/15/2024, no missed periods.   Takes vitamin D 2000 units daily for vitamin D insufficiency.   SH:  Defended dissertation in June 2021. Just got a new job at [employer]    Interval hx: Still thinking about fertility planning, but in the far future as she would like to get weight lower prior to trying again, so plans to conceive are currently on hold. Current Regimen: Levothyroxine was increased from 75 mcg daily to 75 mcg x 8 tabs per week since last visit 12/2022- adherent and takes on empty stomach then other meds at night. Symptoms: Chronic fatigue, weight gain 20 lbs in 4 months (reports she stopped strict diet and just ate in moderation), often runs hot, mild hair loss but stable. BMs normal, mood normal. On lexapro 10 mg once daily. Reports stopped Vyvanse M-F 20mg prn recently. Stopped coq 10 (weird dreams). On vit E and vit D 1000 IU daily Periods are regular, though has "large clots". She is s/p oophorectomy for large right ovarian cyst. Used to be on NuvaRing, has been off all contraceptives since 10 years ago. Possible PCOS?  Reports a previous doctor in [place] believed she had it, but her current OBGYN does not think so.  At age 17 she tried metformin for PCOS. Reports dark hair on her chin (does not remove) and nipples. No galactorrhea. Obesity: She struggles with losing weight (BMI 44.71), feels she is emotionally connected to food but has not been eating any differently and has been watching her diet.  When she tries to exercise, she feels she just gets "jiggly fat", occasionally tries strength training.  She is interested in GLP-1 for weight loss benefit. Reports Wejohn was authorized but has not received it yet due to supply issues. She is willing to pay OOP for GLP-1. Recent A1c 5.4%, LDL 98, HDL 56, TG 75, eGFR 114, LFTs wnl (8/4/2023). No personal h/o DM.  Currently on zepbound 5 mg weekly  Has lost 20 lbs since 1/2024 when she started.  Feels great Walking a lot, increasing protein in diet.  Has itchy skin reaction   FH with DM - dad with Type 2 DM in 6/2022 Denies personal or FHx of pancreatitis or thyroid cancer. She is s/p cholecystectomy for gallstones (22 years ago). Also has abdominal hernia she would like to have resolved but states was told she needs to lose weight before that.

## 2024-05-17 NOTE — ASSESSMENT
[Levothyroxine] : The patient was instructed to take Levothyroxine on an empty stomach, separate from vitamins, and wait at least 30 minutes before eating [FreeTextEntry1] : 38 year old woman with hypothyroidism and prior hyperPRL (now resolved), also with obesity. Here for follow up.  1. Hypothyroidism, acquired: - she has been on LT4 for several years for TSH optimization in the setting of infertility. TPOab negative - she appears clinically euthyroid today - not currently planning pregnant - continue current dose of LT4 75 mcg x 8 tabs per week (doubles on 1 day), can repeat TFTs now  2. Hyperprolactinemia, resolved - noted to have mildly elevated prolactin of 26.2 in 3/2020, repeat Prolactin was normal. Had elevated macroPRL on outside labs in 7/2022 - asymptomatic and regular menses, defer checking again today  3. Vitamin D insufficiency: - recent vitamin D level 30.3 wnl (8/4/2023) - continue vit d 1000 iu daily  4. Obesity Goal weight 205 lbs  - Patient is a good candidate for GLP-1 for weight loss benefit (BMI 44.7). Has tried some lifestyle modifications with none to minimal success for weight loss. R+B of GLP-1 discussed with the patient. Denies personal or FHx of pancreatitis or thyroid cancer. - Had not received Wegovy 0.25 mg weekly yet due to supply issues. Patient is willing to pay OOP for GLP-1 as she believes weight loss will benefit her health in multiple ways. Discussed that GLP-1 should be used in adjunct to diet and exercise modifications. Advised would have to stop GLP-1 few months prior to actively trying to conceive or if pregnant, and best to use protection while on medication. Cannot use while pregnant. Pt is not planning pregnancy at this time. - avoid  add on phentermine per pt preference after discussing adverse effects today Plan: zepbound 7.5 mg weekly. Can take q8 days given shortages to stretch supply  rtc 4-6 months

## 2024-05-30 ENCOUNTER — TRANSCRIPTION ENCOUNTER (OUTPATIENT)
Age: 39
End: 2024-05-30

## 2024-06-25 ENCOUNTER — TRANSCRIPTION ENCOUNTER (OUTPATIENT)
Age: 39
End: 2024-06-25

## 2024-06-25 RX ORDER — TIRZEPATIDE 5 MG/.5ML
5 INJECTION, SOLUTION SUBCUTANEOUS
Qty: 1 | Refills: 2 | Status: ACTIVE | COMMUNITY
Start: 2024-06-25 | End: 1900-01-01

## 2024-06-26 ENCOUNTER — TRANSCRIPTION ENCOUNTER (OUTPATIENT)
Age: 39
End: 2024-06-26

## 2024-07-11 ENCOUNTER — APPOINTMENT (OUTPATIENT)
Dept: ENDOCRINOLOGY | Facility: CLINIC | Age: 39
End: 2024-07-11
Payer: COMMERCIAL

## 2024-07-11 DIAGNOSIS — E03.9 HYPOTHYROIDISM, UNSPECIFIED: ICD-10-CM

## 2024-07-11 DIAGNOSIS — E55.9 VITAMIN D DEFICIENCY, UNSPECIFIED: ICD-10-CM

## 2024-07-11 DIAGNOSIS — E22.1 HYPERPROLACTINEMIA: ICD-10-CM

## 2024-07-11 DIAGNOSIS — E66.01 MORBID (SEVERE) OBESITY DUE TO EXCESS CALORIES: ICD-10-CM

## 2024-07-11 PROCEDURE — 99214 OFFICE O/P EST MOD 30 MIN: CPT

## 2024-07-11 PROCEDURE — G2211 COMPLEX E/M VISIT ADD ON: CPT | Mod: NC,1L

## 2024-07-31 ENCOUNTER — TRANSCRIPTION ENCOUNTER (OUTPATIENT)
Age: 39
End: 2024-07-31

## 2024-08-01 ENCOUNTER — TRANSCRIPTION ENCOUNTER (OUTPATIENT)
Age: 39
End: 2024-08-01

## 2024-08-16 ENCOUNTER — APPOINTMENT (OUTPATIENT)
Dept: ENDOCRINOLOGY | Facility: CLINIC | Age: 39
End: 2024-08-16

## 2024-08-16 VITALS
SYSTOLIC BLOOD PRESSURE: 118 MMHG | OXYGEN SATURATION: 98 % | WEIGHT: 254 LBS | HEART RATE: 89 BPM | BODY MASS INDEX: 41 KG/M2 | DIASTOLIC BLOOD PRESSURE: 78 MMHG

## 2024-08-16 DIAGNOSIS — E55.9 VITAMIN D DEFICIENCY, UNSPECIFIED: ICD-10-CM

## 2024-08-16 DIAGNOSIS — E03.9 HYPOTHYROIDISM, UNSPECIFIED: ICD-10-CM

## 2024-08-16 DIAGNOSIS — E22.1 HYPERPROLACTINEMIA: ICD-10-CM

## 2024-08-16 DIAGNOSIS — E66.01 MORBID (SEVERE) OBESITY DUE TO EXCESS CALORIES: ICD-10-CM

## 2024-08-16 PROCEDURE — G2211 COMPLEX E/M VISIT ADD ON: CPT | Mod: NC

## 2024-08-16 PROCEDURE — 99214 OFFICE O/P EST MOD 30 MIN: CPT

## 2024-08-16 NOTE — HISTORY OF PRESENT ILLNESS
[FreeTextEntry1] : chief complaint: hypothyroidism, weight management   Patient is a 38 year old woman with hypothyroidism, here for follow up of hypothyroidism. She was referred to endocrine from reproductive endocrinology for management of hypothyroidism. She has been off birth control since her late 20s and has not become pregnant since then. She has been on Levothyroxine since around the age of 32 when she first was evaluated by an infertility doctor at [facility]. At one point, she self discontinued the LT4 for about 2 weeks but felt very unwell, so went back on LT4. Was following with reproductive endocrinology with [facility]. Has not been back due to covid/school. Still deciding future fertility plans, which are currently on hold.  She takes LT4 daily in the AM, on an empty stomach, and waits 1 hour before eating. No neck pain, dysphagia, dysphonia. No chest pain, palpitations, SOB, cough, abdominal pain, nausea, vomiting, diarrhea, constipation. No heat or cold intolerance. Weight fluctuates, more recent dealing with weight gain. Has some hair loss, no skin changes. There is no family history of thyroid disease. No history of RT or surgery to the neck.   Not actively trying to get pregnant now but not using protection. Has not been back to repro endo since before covid. She failed IUI x 3 previously.   She was also noted to have elevated prolactin of 26.2 in 3/2020, but repeat prolactin was normal in 6/2020 and 4/2021. Does not have galactorrhea or breast tenderness. Menses occur monthly every 28-30 days, has some mid-cycle spotting. LMP 1/15/2024, no missed periods.   Takes vitamin D 2000 units daily for vitamin D insufficiency.   SH:  Defended dissertation in June 2021. Just got a new job at [employer]    Interval hx: Still thinking about fertility planning, but in the far future as she would like to get weight lower prior to trying again, so plans to conceive are currently on hold. Current Regimen: Levothyroxine was increased from 75 mcg daily to 75 mcg x 8 tabs per week since last visit 12/2022- adherent and takes on empty stomach then other meds at night.  On lexapro 10 mg once daily. Reports stopped Vyvanse M-F 20mg prn recently. Stopped coq 10 (weird dreams). On vit E and vit D 1000 IU daily  Possible PCOS?  Reports a previous doctor in [place] believed she had it, but her current OBGYN does not think so.  At age 17 she tried metformin for PCOS. Reports dark hair on her chin (does not remove) and nipples. No galactorrhea.  Obesity: She struggles with losing weight (BMI 44.71), feels she is emotionally connected to food but has not been eating any differently and has been watching her diet.  When she tries to exercise, she feels she just gets "jiggly fat", occasionally tries strength training.  She is interested in GLP-1 for weight loss benefit. Reports Mitesh was authorized but has not received it yet due to supply issues. She is willing to pay OOP for GLP-1. Recent A1c 5.4%, LDL 98, HDL 56, TG 75, eGFR 114, LFTs wnl (8/4/2023). No personal h/o DM.  Currently about to resume zepbound 5 mg weekly due to shortages Has lost 20 lbs since 1/2024 when she started.   FH with DM - dad with Type 2 DM in 6/2022 Denies personal or FHx of pancreatitis or thyroid cancer. She is s/p cholecystectomy for gallstones (22 years ago). Also has abdominal hernia she would like to have resolved but states was told she needs to lose weight before that.    cravings down, down 23 lbs, just injection stie reaction  loves cured meats, eggs, cottage cheese  possibly heavier periods Exercise: [id number] steps per day

## 2024-08-16 NOTE — ASSESSMENT
[Levothyroxine] : The patient was instructed to take Levothyroxine on an empty stomach, separate from vitamins, and wait at least 30 minutes before eating [FreeTextEntry1] : 38 year old woman with hypothyroidism and prior hyperPRL (now resolved), also with obesity. Here for follow up. s/p oophorectomy age 18 for ovarian cyst, no pcos, has regular menses. Hx of ADHD, previously on vyvanse  Takes lexapro  1. Hypothyroidism, acquired: - she has been on LT4 for several years for TSH optimization in the setting of infertility. TPOab negative - she appears clinically euthyroid - not currently planning pregnant - continue current dose of LT4 75 mcg x 8 tabs per week (doubles on 1 day),  2. Hyperprolactinemia, resolved - noted to have mildly elevated prolactin of 26.2 in 3/2020, repeat Prolactin was normal. Had elevated macroPRL on outside labs in 7/2022 - asymptomatic and regular menses, defer checking again  3. Vitamin D insufficiency: - continue vit d 1000 iu daily  4. Obesity Goal weight 205 lbs  - Patient is a candidate for GLP-1 for weight loss benefit (BMI 40). Has tried some lifestyle modifications with none to minimal success for weight loss. R+B of GLP-1 discussed with the patient. Denies personal or FHx of pancreatitis or thyroid cancer. - Had not received Wegovy 0.25 mg weekly yet due to supply issues. Patient is willing to pay OOP for GLP-1 as she believes weight loss will benefit her health in multiple ways. Discussed that GLP-1 should be used in adjunct to diet and exercise modifications. Advised would have to stop GLP-1 few months prior to actively trying to conceive or if pregnant, and best to use protection while on medication. Cannot use while pregnant. Pt is not planning pregnancy at this time.  Plan: resume zepbound 5 mg weekly (was off for several weeks). Can take q8 days given shortages to stretch supply  Discussed other med classes in case zepbound - avoid  add on phentermine per pt preference after discussing adverse effects- discussed Orlistat, plenity, contrave, metformin - took this previously when she had ovarian cyst age 18 Contrave may be possible alternative therapy if therapist/psych ok with it. Pt with hx of ADHD, depression on lexapro. Discussed risk however of increased suicidal thoughts reported on this med so would need to be started in conjunction with psychiatrist's support./ discussed dry mouth, GI side effects, neuro side effects, HTN risk.  rtc 6 months

## 2024-08-22 ENCOUNTER — RX RENEWAL (OUTPATIENT)
Age: 39
End: 2024-08-22

## 2024-10-02 ENCOUNTER — TRANSCRIPTION ENCOUNTER (OUTPATIENT)
Age: 39
End: 2024-10-02

## 2024-11-05 ENCOUNTER — TRANSCRIPTION ENCOUNTER (OUTPATIENT)
Age: 39
End: 2024-11-05

## 2024-12-11 ENCOUNTER — TRANSCRIPTION ENCOUNTER (OUTPATIENT)
Age: 39
End: 2024-12-11

## 2025-01-06 ENCOUNTER — APPOINTMENT (OUTPATIENT)
Dept: ENDOCRINOLOGY | Facility: CLINIC | Age: 40
End: 2025-01-06
Payer: COMMERCIAL

## 2025-01-06 VITALS
WEIGHT: 251 LBS | SYSTOLIC BLOOD PRESSURE: 114 MMHG | DIASTOLIC BLOOD PRESSURE: 70 MMHG | OXYGEN SATURATION: 98 % | HEIGHT: 66 IN | BODY MASS INDEX: 40.34 KG/M2 | HEART RATE: 84 BPM

## 2025-01-06 DIAGNOSIS — E03.9 HYPOTHYROIDISM, UNSPECIFIED: ICD-10-CM

## 2025-01-06 DIAGNOSIS — E66.01 MORBID (SEVERE) OBESITY DUE TO EXCESS CALORIES: ICD-10-CM

## 2025-01-06 PROCEDURE — G2211 COMPLEX E/M VISIT ADD ON: CPT | Mod: NC

## 2025-01-06 PROCEDURE — 99214 OFFICE O/P EST MOD 30 MIN: CPT

## 2025-01-10 ENCOUNTER — TRANSCRIPTION ENCOUNTER (OUTPATIENT)
Age: 40
End: 2025-01-10

## 2025-01-10 LAB
25(OH)D3 SERPL-MCNC: 36.7 NG/ML
ALBUMIN SERPL ELPH-MCNC: 4.3 G/DL
ALP BLD-CCNC: 79 U/L
ALT SERPL-CCNC: 35 U/L
ANION GAP SERPL CALC-SCNC: 16 MMOL/L
AST SERPL-CCNC: 20 U/L
BILIRUB SERPL-MCNC: 0.3 MG/DL
BUN SERPL-MCNC: 11 MG/DL
CALCIUM SERPL-MCNC: 9.7 MG/DL
CHLORIDE SERPL-SCNC: 103 MMOL/L
CHOLEST SERPL-MCNC: 195 MG/DL
CO2 SERPL-SCNC: 22 MMOL/L
CREAT SERPL-MCNC: 0.73 MG/DL
EGFR: 107 ML/MIN/1.73M2
ESTIMATED AVERAGE GLUCOSE: 103 MG/DL
GLUCOSE SERPL-MCNC: 84 MG/DL
HBA1C MFR BLD HPLC: 5.2 %
HDLC SERPL-MCNC: 61 MG/DL
LDLC SERPL CALC-MCNC: 118 MG/DL
NONHDLC SERPL-MCNC: 134 MG/DL
POTASSIUM SERPL-SCNC: 4.3 MMOL/L
PROT SERPL-MCNC: 7 G/DL
SODIUM SERPL-SCNC: 140 MMOL/L
T4 FREE SERPL-MCNC: 1.2 NG/DL
TRIGL SERPL-MCNC: 88 MG/DL
TSH SERPL-ACNC: 1.35 UIU/ML

## 2025-01-17 ENCOUNTER — TRANSCRIPTION ENCOUNTER (OUTPATIENT)
Age: 40
End: 2025-01-17

## 2025-01-21 ENCOUNTER — RX RENEWAL (OUTPATIENT)
Age: 40
End: 2025-01-21

## 2025-02-05 ENCOUNTER — NON-APPOINTMENT (OUTPATIENT)
Age: 40
End: 2025-02-05

## 2025-02-05 ENCOUNTER — APPOINTMENT (OUTPATIENT)
Dept: INTERNAL MEDICINE | Facility: CLINIC | Age: 40
End: 2025-02-05
Payer: COMMERCIAL

## 2025-02-05 VITALS
WEIGHT: 251 LBS | SYSTOLIC BLOOD PRESSURE: 122 MMHG | DIASTOLIC BLOOD PRESSURE: 80 MMHG | BODY MASS INDEX: 40.34 KG/M2 | OXYGEN SATURATION: 98 % | HEIGHT: 66 IN | HEART RATE: 81 BPM

## 2025-02-05 DIAGNOSIS — K46.9 UNSPECIFIED ABDOMINAL HERNIA W/OUT OBSTRUCTION OR GANGRENE: ICD-10-CM

## 2025-02-05 DIAGNOSIS — E03.9 HYPOTHYROIDISM, UNSPECIFIED: ICD-10-CM

## 2025-02-05 DIAGNOSIS — Z00.00 ENCOUNTER FOR GENERAL ADULT MEDICAL EXAMINATION W/OUT ABNORMAL FINDINGS: ICD-10-CM

## 2025-02-05 DIAGNOSIS — E55.9 VITAMIN D DEFICIENCY, UNSPECIFIED: ICD-10-CM

## 2025-02-05 DIAGNOSIS — Z83.3 FAMILY HISTORY OF DIABETES MELLITUS: ICD-10-CM

## 2025-02-05 DIAGNOSIS — E66.01 MORBID (SEVERE) OBESITY DUE TO EXCESS CALORIES: ICD-10-CM

## 2025-02-05 DIAGNOSIS — Z78.9 OTHER SPECIFIED HEALTH STATUS: ICD-10-CM

## 2025-02-05 PROCEDURE — 36415 COLL VENOUS BLD VENIPUNCTURE: CPT

## 2025-02-05 PROCEDURE — 93000 ELECTROCARDIOGRAM COMPLETE: CPT

## 2025-02-05 PROCEDURE — 99386 PREV VISIT NEW AGE 40-64: CPT

## 2025-02-05 RX ORDER — MULTIVITAMIN
TABLET ORAL
Refills: 0 | Status: ACTIVE | COMMUNITY

## 2025-02-05 RX ORDER — ESCITALOPRAM OXALATE 10 MG/1
10 TABLET, FILM COATED ORAL
Refills: 0 | Status: ACTIVE | COMMUNITY

## 2025-02-10 ENCOUNTER — TRANSCRIPTION ENCOUNTER (OUTPATIENT)
Age: 40
End: 2025-02-10

## 2025-02-10 LAB
APPEARANCE: CLEAR
BACTERIA: ABNORMAL /HPF
BASOPHILS # BLD AUTO: 0.06 K/UL
BASOPHILS NFR BLD AUTO: 1 %
BILIRUBIN URINE: NEGATIVE
BLOOD URINE: ABNORMAL
CAST: 1 /LPF
COLOR: YELLOW
EOSINOPHIL # BLD AUTO: 0.1 K/UL
EOSINOPHIL NFR BLD AUTO: 1.7 %
EPITHELIAL CELLS: 8 /HPF
FERRITIN SERPL-MCNC: 35 NG/ML
FOLATE SERPL-MCNC: >20 NG/ML
GLUCOSE QUALITATIVE U: NEGATIVE MG/DL
HCT VFR BLD CALC: 41.7 %
HGB BLD-MCNC: 13.7 G/DL
IMM GRANULOCYTES NFR BLD AUTO: 0.3 %
IRON SATN MFR SERPL: 20 %
IRON SERPL-MCNC: 63 UG/DL
KETONES URINE: NEGATIVE MG/DL
LEUKOCYTE ESTERASE URINE: NEGATIVE
LYMPHOCYTES # BLD AUTO: 1.76 K/UL
LYMPHOCYTES NFR BLD AUTO: 30.4 %
MAN DIFF?: NORMAL
MCHC RBC-ENTMCNC: 28.8 PG
MCHC RBC-ENTMCNC: 32.9 G/DL
MCV RBC AUTO: 87.8 FL
MICROSCOPIC-UA: NORMAL
MONOCYTES # BLD AUTO: 0.36 K/UL
MONOCYTES NFR BLD AUTO: 6.2 %
NEUTROPHILS # BLD AUTO: 3.48 K/UL
NEUTROPHILS NFR BLD AUTO: 60.4 %
NITRITE URINE: NEGATIVE
PH URINE: 5.5
PLATELET # BLD AUTO: 278 K/UL
PROTEIN URINE: NEGATIVE MG/DL
RBC # BLD: 4.75 M/UL
RBC # FLD: 13.2 %
RED BLOOD CELLS URINE: 2 /HPF
REVIEW: NORMAL
SPECIFIC GRAVITY URINE: 1.02
TIBC SERPL-MCNC: 310 UG/DL
UIBC SERPL-MCNC: 246 UG/DL
UROBILINOGEN URINE: 0.2 MG/DL
VIT B12 SERPL-MCNC: 502 PG/ML
WBC # FLD AUTO: 5.78 K/UL
WHITE BLOOD CELLS URINE: 6 /HPF

## 2025-06-27 ENCOUNTER — RX RENEWAL (OUTPATIENT)
Age: 40
End: 2025-06-27

## 2025-07-21 ENCOUNTER — TRANSCRIPTION ENCOUNTER (OUTPATIENT)
Age: 40
End: 2025-07-21

## 2025-07-22 ENCOUNTER — TRANSCRIPTION ENCOUNTER (OUTPATIENT)
Age: 40
End: 2025-07-22